# Patient Record
Sex: FEMALE
[De-identification: names, ages, dates, MRNs, and addresses within clinical notes are randomized per-mention and may not be internally consistent; named-entity substitution may affect disease eponyms.]

---

## 2018-03-10 ENCOUNTER — HOSPITAL ENCOUNTER (EMERGENCY)
Dept: HOSPITAL 14 - H.ER | Age: 68
Discharge: HOME | End: 2018-03-10
Payer: MEDICARE

## 2018-03-10 VITALS — SYSTOLIC BLOOD PRESSURE: 118 MMHG | DIASTOLIC BLOOD PRESSURE: 80 MMHG | RESPIRATION RATE: 20 BRPM | HEART RATE: 79 BPM

## 2018-03-10 VITALS — BODY MASS INDEX: 18.8 KG/M2

## 2018-03-10 VITALS — TEMPERATURE: 97.8 F

## 2018-03-10 DIAGNOSIS — S20.219A: Primary | ICD-10-CM
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
## 2018-03-10 DIAGNOSIS — Z85.850: ICD-10-CM

## 2018-03-10 DIAGNOSIS — Z88.0: ICD-10-CM

## 2018-03-10 DIAGNOSIS — F17.200: ICD-10-CM

## 2018-03-10 DIAGNOSIS — J44.9: ICD-10-CM
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
## 2018-03-10 DIAGNOSIS — Z88.5: ICD-10-CM

## 2018-03-10 LAB
APTT BLD: 31.7 SECONDS (ref 25.6–37.1)
BASOPHILS # BLD AUTO: 0 K/UL (ref 0–0.2)
BASOPHILS NFR BLD: 0.8 % (ref 0–2)
BUN SERPL-MCNC: 6 MG/DL (ref 7–17)
CALCIUM SERPL-MCNC: 8.9 MG/DL (ref 8.4–10.2)
EOSINOPHIL # BLD AUTO: 0.1 K/UL (ref 0–0.7)
EOSINOPHIL NFR BLD: 1.9 % (ref 0–4)
ERYTHROCYTE [DISTWIDTH] IN BLOOD BY AUTOMATED COUNT: 13.5 % (ref 11.5–14.5)
GFR NON-AFRICAN AMERICAN: > 60
HGB BLD-MCNC: 13.4 G/DL (ref 12–16)
INR PPP: 0.9 (ref 0.9–1.2)
LYMPHOCYTES # BLD AUTO: 1.4 K/UL (ref 1–4.3)
LYMPHOCYTES NFR BLD AUTO: 28.3 % (ref 20–40)
MCH RBC QN AUTO: 33.6 PG (ref 27–31)
MCHC RBC AUTO-ENTMCNC: 33.9 G/DL (ref 33–37)
MCV RBC AUTO: 98.8 FL (ref 81–99)
MONOCYTES # BLD: 0.6 K/UL (ref 0–0.8)
MONOCYTES NFR BLD: 12.4 % (ref 0–10)
NEUTROPHILS # BLD: 2.7 K/UL (ref 1.8–7)
NEUTROPHILS NFR BLD AUTO: 56.6 % (ref 50–75)
NRBC BLD AUTO-RTO: 0.1 % (ref 0–0)
PLATELET # BLD: 219 K/UL (ref 130–400)
PMV BLD AUTO: 7.7 FL (ref 7.2–11.7)
PROTHROMBIN TIME: 9.8 SECONDS (ref 9.8–13.1)
RBC # BLD AUTO: 3.98 MIL/UL (ref 3.8–5.2)
WBC # BLD AUTO: 4.8 K/UL (ref 4.8–10.8)

## 2018-03-10 PROCEDURE — 71120 X-RAY EXAM BREASTBONE 2/>VWS: CPT

## 2018-03-10 PROCEDURE — 99283 EMERGENCY DEPT VISIT LOW MDM: CPT

## 2018-03-10 PROCEDURE — 85610 PROTHROMBIN TIME: CPT

## 2018-03-10 PROCEDURE — 85730 THROMBOPLASTIN TIME PARTIAL: CPT

## 2018-03-10 PROCEDURE — 93005 ELECTROCARDIOGRAM TRACING: CPT

## 2018-03-10 PROCEDURE — 96374 THER/PROPH/DIAG INJ IV PUSH: CPT

## 2018-03-10 PROCEDURE — 80048 BASIC METABOLIC PNL TOTAL CA: CPT

## 2018-03-10 PROCEDURE — 71046 X-RAY EXAM CHEST 2 VIEWS: CPT

## 2018-03-10 PROCEDURE — 85025 COMPLETE CBC W/AUTO DIFF WBC: CPT

## 2018-03-10 PROCEDURE — 84484 ASSAY OF TROPONIN QUANT: CPT

## 2018-03-10 NOTE — RAD
HISTORY:

chest pain  



COMPARISON:

No prior.



TECHNIQUE:

Chest PA and lateral



FINDINGS:



LUNGS:

No active pulmonary disease. 3.5 centimeter possible mass along the 

posterior inferior lung field (question right versus left) noted on 

lateral view only. 



PLEURA:

No significant pleural effusion identified. No pneumothorax apparent.



CARDIOVASCULAR:

Normal.



OSSEOUS STRUCTURES:

No significant abnormalities.



VISUALIZED UPPER ABDOMEN:

Normal.



OTHER FINDINGS:

None.



IMPRESSION:

3.5 centimeter possible mass along the posterior inferior lung field 

(question right versus left) noted on lateral view only.

## 2018-03-10 NOTE — ED PDOC
HPI: Chest Pain


Time Seen by Provider: 03/10/18 05:49


Chief Complaint (Nursing): Chest Pain


Chief Complaint (Provider): Chest Pain


History Per: Patient


History/Exam Limitations: no limitations


Additional Complaint(s): 


 68 y/o female with past medical history of COPD and thyroid cancer presents to 

the ED for evaluation of chest pain. About a week ago she fell and hit her 

sternum against dresser and ever since pain has been worsening. Patient has 

difficulty breathing and cough. Denies any further medical complaints.








Past Medical History


Reviewed: Historical Data, Nursing Documentation, Vital Signs


Vital Signs: 


 Last Vital Signs











Temp  97.8 F   03/10/18 05:50


 


Pulse  79   03/10/18 09:42


 


Resp  20   03/10/18 09:42


 


BP  118/80   03/10/18 09:42


 


Pulse Ox  96   03/10/18 09:42














- Medical History


PMH: Arthritis (TENDERNITIS), COPD, Gastrointestinal Ulcer


   Denies: Chronic Kidney Disease





- Surgical History


Surgical History: Tonsillectomy





- Family History


Family History: States: Unknown Family Hx





- Social History


Current smoker - smoking cessation education provided: Yes (Heavy smoker >10 

ciarettes daily)


Alcohol: None


Drugs: Denies





- Home Medications


Home Medications: 


 Ambulatory Orders











 Medication  Instructions  Recorded


 


traMADol [Ultram] 50 mg PO Q8 #10 tab 03/10/18














- Allergies


Allergies/Adverse Reactions: 


 Allergies











Allergy/AdvReac Type Severity Reaction Status Date / Time


 


acetaminophen Allergy  RASH Verified 01/19/16 07:30


 


oxycodone Allergy  RASH Verified 03/10/18 05:49


 


Penicillins Allergy  ANAPHYLAXIS Verified 03/10/18 05:49


 


propoxyphene napsylate Allergy  RASH Verified 03/10/18 05:49





[From Darvocet-N]     


 


ciprofloxacin AdvReac  RASH Verified 03/10/18 05:49


 


ketorolac tromethamine AdvReac  RASH Verified 03/10/18 05:49





[From Toradol]     


 


NSAIDS (Non-Steroidal AdvReac  PAIN Verified 03/10/18 05:49





Anti-Inflamma     














Review of Systems


ROS Statement: Except As Marked, All Systems Reviewed And Found Negative (As 

per HPI, otherwise negative)


Cardiovascular: Positive for: Chest Pain





Physical Exam





- Reviewed


Nursing Documentation Reviewed: Yes


Vital Signs Reviewed: Yes





- Physical Exam


Appears: Positive for: Well, Non-toxic, No Acute Distress


Head Exam: Positive for: ATRAUMATIC, NORMAL INSPECTION, NORMOCEPHALIC


Skin: Positive for: Normal Color, Warm, Dry


Eye Exam: Positive for: EOMI, Normal appearance, PERRL


ENT: Positive for: Normal ENT Inspection


Neck: Positive for: Normal, Painless ROM, Supple


Cardiovascular/Chest: Positive for: Regular Rate, Rhythm, Other (Palpation to 

mid sternum, no crepatous or step off noted).  Negative for: Murmur


Respiratory: Positive for: Normal Breath Sounds.  Negative for: Accessory 

Muscle Use, Respiratory Distress


Gastrointestinal/Abdominal: Positive for: Normal Exam, Bowel Sounds, Soft.  

Negative for: Tenderness


Back: Positive for: Normal Inspection


Extremity: Positive for: Normal ROM.  Negative for: Deformity


Neurologic/Psych: Positive for: Alert, Oriented (x3)





- Laboratory Results


Result Diagrams: 


 03/10/18 06:18





 03/10/18 06:18





- ECG


O2 Sat by Pulse Oximetry: 94 (RA)


Pulse Ox Interpretation: Normal





Medical Decision Making


Medical Decision Making: 


 Time: 06:16





Initial Impression: Sternum contusion





Plan:


EKG


BMP


Troponin I


Partial thromboplastin   


Prothrombin time


Chest x-ray


Flexeril 10mg PO


Morphine 4mg IVP


Cardiac monitor


Sternum x-ray


Reevaluation 





0700


Will endorse to Dr. Borges pending xray and re-eval.


--------------------------------------------------------------------------------

-----------------


Scribe Attestation:


Documented by Heidy Amin acting as a scribe for Desmond Melchor MD.


      


MD Scribe Attestation:


All medical record entries made by the Scribe were at my direction and 

personally dictated by me. I have reviewed the chart and agree that the record 

accurately reflects my personal performance of the history, physical exam, 

medical decision making, and the department course for this patient. I have 

also personally directed, reviewed, and agree with the discharge instructions 

and disposition.








Disposition





- Clinical Impression


Clinical Impression: 


 Contusion of chest wall








- Patient ED Disposition


Is Patient to be Admitted: Transfer of Care





- Disposition


Referrals: 


[facility] [Outside]


Disposition: Transfer of Care


Disposition Time: 07:00


Condition: FAIR


Prescriptions: 


traMADol [Ultram] 50 mg PO Q8 #10 tab


Instructions:  Contusion (DC)


Forms:  [facility] (English)


Patient Signed Over To: Osmany Borges


Handoff Comments: pending x-ray and re-eval

## 2018-03-10 NOTE — RAD
HISTORY:

chest pain, sternal pain after injury  



COMPARISON:

No prior



FINDINGS:



BONES:

Normal. No fracture.



JOINTS:

Normal. No osteoarthritis.



SOFT TISSUE:

Normal.



OTHER FINDINGS:

None .



IMPRESSION:

Normal Bone Xray.

## 2018-03-10 NOTE — ED PDOC
- Laboratory Results


Result Diagrams: 


 03/10/18 06:18





 03/10/18 06:18





- ECG


O2 Sat by Pulse Oximetry: 97





Medical Decision Making


Medical Decision Making: 





No fx seen on xray sternum or CXR. ? mass lower post lung feild on lateral 

view. Appears to have similar appearance 1/2017 CXR lat view. Will have pt 

follow up with PMD for eval of possible mass.





Disposition





- Clinical Impression


Clinical Impression: 


 Contusion of chest wall








- POA


Present On Arrival: None





- Disposition


Referrals: 


[facility] [Outside]


Disposition: Routine/Home


Disposition Time: 09:16


Condition: FAIR


Prescriptions: 


traMADol [Ultram] 50 mg PO Q8 #10 tab


Instructions:  Contusion (DC)


Forms:  [facility] (English)

## 2018-03-11 VITALS — OXYGEN SATURATION: 94 %

## 2018-03-11 NOTE — CARD
--------------- APPROVED REPORT --------------





EKG Measurement

Heart [id number]

OR [id number]

[id number]

[id number]

[id number]



<Conclusion>

Normal sinus rhythm

Normal ECG

## 2018-10-05 ENCOUNTER — HOSPITAL ENCOUNTER (EMERGENCY)
Dept: HOSPITAL 14 - H.ER | Age: 68
Discharge: LEFT BEFORE DISCHARGE | End: 2018-10-05
Payer: MEDICARE

## 2018-10-05 VITALS — BODY MASS INDEX: 18.8 KG/M2

## 2018-10-05 VITALS — SYSTOLIC BLOOD PRESSURE: 108 MMHG | DIASTOLIC BLOOD PRESSURE: 60 MMHG

## 2018-10-05 VITALS — TEMPERATURE: 98 F | RESPIRATION RATE: 18 BRPM

## 2018-10-05 DIAGNOSIS — R07.89: ICD-10-CM

## 2018-10-05 DIAGNOSIS — Z88.1: ICD-10-CM

## 2018-10-05 DIAGNOSIS — R00.0: Primary | ICD-10-CM

## 2018-10-05 DIAGNOSIS — R00.2: ICD-10-CM

## 2018-10-05 DIAGNOSIS — M19.90: ICD-10-CM

## 2018-10-05 DIAGNOSIS — J44.9: ICD-10-CM

## 2018-10-05 DIAGNOSIS — Z88.0: ICD-10-CM

## 2018-10-05 LAB
ALBUMIN SERPL-MCNC: 3.9 G/DL (ref 3.5–5)
ALBUMIN/GLOB SERPL: 1.4 {RATIO} (ref 1–2.1)
ALT SERPL-CCNC: 19 U/L (ref 9–52)
APTT BLD: 32.1 SECONDS (ref 25.6–37.1)
ARTERIAL BLOOD GAS O2 SAT: 98.7 % (ref 95–98)
ARTERIAL BLOOD GAS PCO2: 42 MM/HG (ref 35–45)
ARTERIAL BLOOD GAS TCO2: 28.5 MMOL/L (ref 22–28)
ARTERIAL PATENCY WRIST A: YES
AST SERPL-CCNC: 38 U/L (ref 14–36)
BASOPHILS # BLD AUTO: 0 K/UL (ref 0–0.2)
BASOPHILS NFR BLD: 0.7 % (ref 0–2)
BNP SERPL-MCNC: 318 PG/ML (ref 0–900)
BUN SERPL-MCNC: 11 MG/DL (ref 7–17)
CALCIUM SERPL-MCNC: 9 MG/DL (ref 8.4–10.2)
D DIMER: < 200 NG/MLDDU (ref 0–230)
EOSINOPHIL # BLD AUTO: 0.1 K/UL (ref 0–0.7)
EOSINOPHIL NFR BLD: 1.9 % (ref 0–4)
ERYTHROCYTE [DISTWIDTH] IN BLOOD BY AUTOMATED COUNT: 13.5 % (ref 11.5–14.5)
GFR NON-AFRICAN AMERICAN: > 60
HCO3 BLDA-SCNC: 26.7 MMOL/L (ref 21–28)
HGB BLD-MCNC: 13.8 G/DL (ref 12–16)
INHALED O2 CONCENTRATION: 21 %
INR PPP: 0.9
LYMPHOCYTES # BLD AUTO: 1.2 K/UL (ref 1–4.3)
LYMPHOCYTES NFR BLD AUTO: 27 % (ref 20–40)
MCH RBC QN AUTO: 33.8 PG (ref 27–31)
MCHC RBC AUTO-ENTMCNC: 34.5 G/DL (ref 33–37)
MCV RBC AUTO: 97.8 FL (ref 81–99)
MONOCYTES # BLD: 0.4 K/UL (ref 0–0.8)
MONOCYTES NFR BLD: 9.2 % (ref 0–10)
NEUTROPHILS # BLD: 2.8 K/UL (ref 1.8–7)
NEUTROPHILS NFR BLD AUTO: 61.2 % (ref 50–75)
NRBC BLD AUTO-RTO: 0 % (ref 0–0)
PH BLDA: 7.42 [PH] (ref 7.35–7.45)
PLATELET # BLD: 245 K/UL (ref 130–400)
PMV BLD AUTO: 8.1 FL (ref 7.2–11.7)
PO2 BLDA: 75 MM/HG (ref 80–100)
PROTHROMBIN TIME: 9.9 SECONDS (ref 9.8–13.1)
RBC # BLD AUTO: 4.07 MIL/UL (ref 3.8–5.2)
T3: 0.88 NMOL/L (ref 1.49–2.6)
WBC # BLD AUTO: 4.6 K/UL (ref 4.8–10.8)

## 2018-10-05 PROCEDURE — 85025 COMPLETE CBC W/AUTO DIFF WBC: CPT

## 2018-10-05 PROCEDURE — 82803 BLOOD GASES ANY COMBINATION: CPT

## 2018-10-05 PROCEDURE — 93005 ELECTROCARDIOGRAM TRACING: CPT

## 2018-10-05 PROCEDURE — 84439 ASSAY OF FREE THYROXINE: CPT

## 2018-10-05 PROCEDURE — 84100 ASSAY OF PHOSPHORUS: CPT

## 2018-10-05 PROCEDURE — 71045 X-RAY EXAM CHEST 1 VIEW: CPT

## 2018-10-05 PROCEDURE — 83880 ASSAY OF NATRIURETIC PEPTIDE: CPT

## 2018-10-05 PROCEDURE — 80053 COMPREHEN METABOLIC PANEL: CPT

## 2018-10-05 PROCEDURE — 99285 EMERGENCY DEPT VISIT HI MDM: CPT

## 2018-10-05 PROCEDURE — 85610 PROTHROMBIN TIME: CPT

## 2018-10-05 PROCEDURE — 84443 ASSAY THYROID STIM HORMONE: CPT

## 2018-10-05 PROCEDURE — 36600 WITHDRAWAL OF ARTERIAL BLOOD: CPT

## 2018-10-05 PROCEDURE — 84484 ASSAY OF TROPONIN QUANT: CPT

## 2018-10-05 PROCEDURE — 85378 FIBRIN DEGRADE SEMIQUANT: CPT

## 2018-10-05 PROCEDURE — 94640 AIRWAY INHALATION TREATMENT: CPT

## 2018-10-05 PROCEDURE — 87040 BLOOD CULTURE FOR BACTERIA: CPT

## 2018-10-05 PROCEDURE — 96374 THER/PROPH/DIAG INJ IV PUSH: CPT

## 2018-10-05 PROCEDURE — 84480 ASSAY TRIIODOTHYRONINE (T3): CPT

## 2018-10-05 PROCEDURE — 83735 ASSAY OF MAGNESIUM: CPT

## 2018-10-05 PROCEDURE — 85730 THROMBOPLASTIN TIME PARTIAL: CPT

## 2018-10-05 NOTE — ED PDOC
HPI: SOB/CHF/COPD


Time Seen by Provider: 10/05/18 18:08


Chief Complaint (Nursing): Shortness Of Breath


Chief Complaint (Provider): Shortness Of Breath


History Per: Patient


History/Exam Limitations: no limitations


Onset/Duration Of Symptoms: Hrs, Sudden Onset


Current Symptoms Are (Timing): Still Present


Quality: Tightness


Additional Complaint(s): 


Yodit Griffiths is a 67 year old female with a past medical history of 

arthritis and COPD who is presenting to the ED for evaluation of sudden onset 

shortness of breath associated with chest tightness, onset just prior to 

arrival. Patient states that she initially treated herself with her inhaler but 

she didnt not have any relief in symptoms so she came to the ED for further 

evaluation. She admits to recently having some chills, a subjective fever, and 

increased cough with sputum production. Patient reports that the last two weeks,

she has decreased her levothyroxine because her endocrinologist saw that her 

thyroid function seemed to be becoming elevated. She also reports palpitations, 

lightheadedness, and mild anxiety. 


PMD: Dmitry Moore





Endocrinologist: Dr. Mccall











Against Medical Advice





- AMA


Patient Left Against Medical Advice: 


The patient declines admission to the hospital and wishes to leave the Emergency

Department.  This action is against my medical advice. This decision was made 

with informed refusal. The patient was told that admission to the hospital is 

necessary.  Explanation of the reasons why were discussed.  


The risks of leaving were explained to the patient and include, but are not 

limited to, worsening of known or currently unknown conditions, permanent 

disability and death from undiagnosed or untreated conditions. 


The patient has the capacity to make this informed decision and understands my 

explanation of the current medical problem and risks of leaving. 


The patient voluntarily accepts these risks and signed an AMA form documenting 

our conversation.


The patient was given the opportunity to ask questions and reconsider.  The 

patient was encouraged to return to the Emergency Department at any time for 

further care.








Past Medical History


Reviewed: Historical Data, Nursing Documentation, Vital Signs


Vital Signs: 





                                Last Vital Signs











Temp  98.4 F   10/05/18 18:01


 


Pulse  110 H  10/05/18 18:01


 


Resp  18   10/05/18 18:01


 


BP  153/97 H  10/05/18 18:01


 


Pulse Ox  98   10/05/18 18:01














- Medical History


PMH: Arthritis (TENDERNITIS), COPD, Gastrointestinal Ulcer


   Denies: Chronic Kidney Disease





- Surgical History


Surgical History: Tonsillectomy





- Family History


Family History: States: Unknown Family Hx





- Social History


Current smoker - smoking cessation education provided: No


Alcohol: None


Drugs: Denies





- Home Medications


Home Medications: 


                                Ambulatory Orders











 Medication  Instructions  Recorded


 


RX: traMADol [Ultram] 50 mg PO Q8 #10 tab 03/10/18














- Allergies


Allergies/Adverse Reactions: 


                                    Allergies











Allergy/AdvReac Type Severity Reaction Status Date / Time


 


acetaminophen Allergy  RASH Verified 10/05/18 18:01


 


oxycodone Allergy  RASH Verified 10/05/18 18:01


 


Penicillins Allergy  ANAPHYLAXIS Verified 10/05/18 18:01


 


propoxyphene napsylate Allergy  RASH Verified 10/05/18 18:01





[From Darvocet-N]     


 


ciprofloxacin AdvReac  RASH Verified 10/05/18 18:01


 


ketorolac tromethamine AdvReac  RASH Verified 10/05/18 18:01





[From Toradol]     


 


NSAIDS (Non-Steroidal AdvReac  PAIN Verified 10/05/18 18:01





Anti-Inflamma     














Review of Systems


ROS Statement: Except As Marked, All Systems Reviewed And Found Negative


Constitutional: Positive for: Fever


Cardiovascular: Positive for: Chest Pain (tightness )


Respiratory: Positive for: Cough, Shortness of Breath, Sputum


Neurological: Positive for: Other (lightheadedness)





Physical Exam





- Reviewed


Nursing Documentation Reviewed: Yes


Vital Signs Reviewed: Yes





- Physical Exam


Appears: Positive for: No Acute Distress (mildly anxious)


Head Exam: Positive for: ATRAUMATIC, NORMOCEPHALIC


Skin: Positive for: Warm, Dry


Eye Exam: Positive for: EOMI, PERRL


ENT: Positive for: Pharynx Is (clear), Other (dry mucous membranes)


Neck: Positive for: Painless ROM, Supple


Cardiovascular/Chest: Positive for: Tachycardia (with regular rhythm)


Respiratory: Positive for: Other (poor air movement).  Negative for: Rales, 

Rhonchi, Wheezing


Gastrointestinal/Abdominal: Positive for: Soft.  Negative for: Tenderness


Back: Positive for: Normal Inspection.  Negative for: Decreased ROM


Extremity: Positive for: Normal ROM, Other (RIGHT hand splint).  Negative for: 

Deformity


Lymphatic: Negative for: Adenopathy


Neurologic/Psych: Positive for: Alert.  Negative for: Motor/Sensory Deficits





- Laboratory Results


Result Diagrams: 


                                 10/05/18 18:56





                                 10/05/18 18:56





- ECG


ECG Rhythm: Positive for: Normal QRS, Normal ST Segment, Sinus Tachycardia


Rate: 104


O2 Sat by Pulse Oximetry: 98 (RA)


Pulse Ox Interpretation: Normal





Medical Decision Making


Medical Decision Making: 


Time: 18:19


Impression: Tachycardia with shortness of breath


Differentials (including but not limited to): hyperthyroidism, thyroid storm, 

ACS, PE, pneumonia, CHF


Plan:


--Blood Type and Screen


--ABG Shock Panel


--EKG


--BNP


--CMP


--Free T4


--Magnesium


--Phosphorous


--T3


--TSH


--Troponin


--ED Urine Dipsitck


--CBC


--D Dimer


--Coags


--Chest X-Ray


--Blood Culture





Initial labs with no emergently significant abnormalities. Pt's HR improved 

without intervention in ER. However advised that due to cardiac and pulmonary 

risk factors, pt should stay for observation. However pt declines this. 

Understands risk of death or severe disability and wished to go home anyway. AMA

signed.





--------------------------------

-----------------------------------------------------------------


Scribe Attestation:   


Documented by Qi Wong, acting as a scribe for Ruma Shi MD.





Provider Scribe Attestation:


All medical record entries made by the Scribe were at my direction and 

personally dictated by me. I have reviewed the chart and agree that the record 

accurately reflects my personal performance of the history, physical exam, 

medical decision making, and the department course for this patient. I have also

personally directed, reviewed, and agree with the discharge instructions and 

disposition.











Disposition





- Clinical Impression


Clinical Impression: 


 Palpitations, Tachycardia








- Disposition


Disposition: Against Medical Advice


Disposition Time: 21:30


Condition: UNKNOWN


Additional Instructions: 


RETURN TO ER IMMEDIATELY FOR ADMISSION AND FURTHER EVALUATION OF SHORTNESS OF 

BREATH.


Instructions:  Leaving Against Medical Advice

## 2018-10-06 VITALS — HEART RATE: 104 BPM | OXYGEN SATURATION: 98 %

## 2018-10-06 NOTE — RAD
Date of service: 



10/05/2018



HISTORY:

 sob tachycardia 



COMPARISON:

03/10/2018 



FINDINGS:



LUNGS:

Lungs are mildly hyperinflated.  Mild interstitial changes are noted. 

 There family a shin of the left hemidiaphragm, unchanged from prior 

study.  No hilar enlargement is noted.



PLEURA:

No significant pleural effusion identified, no pneumothorax apparent.



CARDIOVASCULAR:

Normal.



OSSEOUS STRUCTURES:

No significant abnormalities.



VISUALIZED UPPER ABDOMEN:

Normal.



OTHER FINDINGS:

None.



IMPRESSION:

No active disease.

## 2018-10-06 NOTE — CARD
--------------- APPROVED REPORT --------------





Date of service: 10/05/2018



EKG Measurement

Heart [id number]

HI [id number]

[id number]

[id number]

[id number]



<Conclusion>

Sinus tachycardia

Cannot rule out Anterior infarct, age undetermined

Abnormal ECG

## 2018-10-25 ENCOUNTER — HOSPITAL ENCOUNTER (OUTPATIENT)
Dept: HOSPITAL 42 - CATH | Age: 68
Discharge: HOME | End: 2018-10-25
Attending: INTERNAL MEDICINE
Payer: MEDICARE

## 2018-10-25 VITALS — HEART RATE: 84 BPM | OXYGEN SATURATION: 92 %

## 2018-10-25 VITALS — BODY MASS INDEX: 18.8 KG/M2

## 2018-10-25 VITALS — TEMPERATURE: 98.5 F

## 2018-10-25 VITALS — DIASTOLIC BLOOD PRESSURE: 72 MMHG | SYSTOLIC BLOOD PRESSURE: 132 MMHG

## 2018-10-25 VITALS — RESPIRATION RATE: 18 BRPM

## 2018-10-25 DIAGNOSIS — Z88.6: ICD-10-CM

## 2018-10-25 DIAGNOSIS — M19.90: ICD-10-CM

## 2018-10-25 DIAGNOSIS — Z85.850: ICD-10-CM

## 2018-10-25 DIAGNOSIS — R07.9: Primary | ICD-10-CM

## 2018-10-25 DIAGNOSIS — Z88.5: ICD-10-CM

## 2018-10-25 DIAGNOSIS — J44.9: ICD-10-CM

## 2018-10-25 DIAGNOSIS — J45.909: ICD-10-CM

## 2018-10-25 DIAGNOSIS — R94.39: ICD-10-CM

## 2018-10-25 DIAGNOSIS — Z88.0: ICD-10-CM

## 2018-10-25 DIAGNOSIS — F17.200: ICD-10-CM

## 2018-10-25 DIAGNOSIS — Z88.1: ICD-10-CM

## 2018-10-25 LAB
APTT BLD: 30.9 SECONDS (ref 25.1–36.5)
BASOPHILS # BLD AUTO: 0.04 K/MM3 (ref 0–2)
BASOPHILS NFR BLD: 1.1 % (ref 0–3)
BUN SERPL-MCNC: 5 MG/DL (ref 7–21)
CALCIUM SERPL-MCNC: 9 MG/DL (ref 8.4–10.5)
EOSINOPHIL # BLD: 0.2 10*3/UL (ref 0–0.7)
EOSINOPHIL NFR BLD: 4.2 % (ref 1.5–5)
ERYTHROCYTE [DISTWIDTH] IN BLOOD BY AUTOMATED COUNT: 13.2 % (ref 11.5–14.5)
GFR NON-AFRICAN AMERICAN: > 60
GRANULOCYTES # BLD: 1.3 10*3/UL (ref 1.4–6.5)
GRANULOCYTES NFR BLD: 34.4 % (ref 50–68)
HDLC SERPL-MCNC: 161 MG/DL (ref 29–60)
HGB BLD-MCNC: 13.8 G/DL (ref 12–16)
INR PPP: 0.91
LDLC SERPL-MCNC: 59 MG/DL (ref 0–129)
LYMPHOCYTES # BLD: 1.7 10*3/UL (ref 1.2–3.4)
LYMPHOCYTES NFR BLD AUTO: 45.5 % (ref 22–35)
MCH RBC QN AUTO: 32.6 PG (ref 25–35)
MCHC RBC AUTO-ENTMCNC: 34.8 G/DL (ref 31–37)
MCV RBC AUTO: 93.9 FL (ref 80–105)
MONOCYTES # BLD AUTO: 0.6 10*3/UL (ref 0.1–0.6)
MONOCYTES NFR BLD: 14.8 % (ref 1–6)
PLATELET # BLD: 229 10^3/UL (ref 120–450)
PMV BLD AUTO: 9.5 FL (ref 7–11)
PROTHROMBIN TIME: 10.5 SECONDS (ref 9.4–12.5)
RBC # BLD AUTO: 4.23 10^6/UL (ref 3.5–6.1)
WBC # BLD AUTO: 3.8 10^3/UL (ref 4.5–11)

## 2018-10-25 NOTE — CARDCATH
PROCEDURE DATE:  10/25/2018



HISTORY:  The patient is a 67-year-old woman with a long history of smoking

and COPD, who presents with a markedly abnormal stress test.  Although her

nuclear studies revealed no ischemia, her EKG changes during her stress

were markedly abnormal.



Because of this, cardiac catheterization was recommended.



PROCEDURES:  Left heart catheterization with coronary arteriography and

left ventriculogram.



COMPLICATIONS:  There were no complications.



I performed moderate sedation, which included the presence of an

independent trained observer that observed the patient's consciousness and

physiologic status.  After administration of fentanyl and Versed, my intra

service time was 15 minutes.



Findings on catheterization revealed a left ventricle that contracted

normally.  Estimated ejection fraction of 55-60%.



Her coronary anatomy revealed a right dominant circulation.  The RCA

revealed mild intimal irregularities without significant stenoses.



The left main artery was unremarkable.



The LAD and diagonal vessels were free of significant disease.



The circumflex artery and obtuse marginal branches revealed intimal

irregularities without critical lesions.



The manual compression was used to close the femoral artery site.  The

patient tolerated the procedure well.



In summary, the procedure revealed mild intimal irregularity in the

coronary tree with no critical lesions.



LV function is normal.



Given these findings, the patient's marked ST-T changes does not represent

active ischemia.  LV function is normal.



Given these findings, I have discussed the need to stop smoking with the

patient in detail.







__________________________________________

Iain Del Rio MD



DD:  10/25/2018 10:31:37

DT:  10/25/2018 10:36:57

Job # [id number]

## 2018-10-26 ENCOUNTER — HOSPITAL ENCOUNTER (EMERGENCY)
Dept: HOSPITAL 14 - H.ER | Age: 68
LOS: 1 days | Discharge: ANOTHER HOSPITAL | End: 2018-10-27
Payer: MEDICARE

## 2018-10-26 VITALS — BODY MASS INDEX: 18.8 KG/M2

## 2018-10-26 DIAGNOSIS — J44.9: ICD-10-CM

## 2018-10-26 DIAGNOSIS — Z88.0: ICD-10-CM

## 2018-10-26 DIAGNOSIS — Z79.01: ICD-10-CM

## 2018-10-26 DIAGNOSIS — Z88.1: ICD-10-CM

## 2018-10-26 DIAGNOSIS — L76.22: Primary | ICD-10-CM

## 2018-10-26 DIAGNOSIS — Z79.82: ICD-10-CM

## 2018-10-26 LAB
ALBUMIN SERPL-MCNC: 4 G/DL (ref 3.5–5)
ALBUMIN/GLOB SERPL: 1.5 {RATIO} (ref 1–2.1)
ALT SERPL-CCNC: 17 U/L (ref 9–52)
APTT BLD: 29.7 SECONDS (ref 25.6–37.1)
AST SERPL-CCNC: 27 U/L (ref 14–36)
BASOPHILS # BLD AUTO: 0 K/UL (ref 0–0.2)
BASOPHILS NFR BLD: 0.9 % (ref 0–2)
BUN SERPL-MCNC: 8 MG/DL (ref 7–17)
CALCIUM SERPL-MCNC: 9.2 MG/DL (ref 8.4–10.2)
EOSINOPHIL # BLD AUTO: 0.1 K/UL (ref 0–0.7)
EOSINOPHIL NFR BLD: 1.7 % (ref 0–4)
ERYTHROCYTE [DISTWIDTH] IN BLOOD BY AUTOMATED COUNT: 13.5 % (ref 11.5–14.5)
GFR NON-AFRICAN AMERICAN: > 60
HGB BLD-MCNC: 12.7 G/DL (ref 12–16)
INR PPP: 1
LYMPHOCYTES # BLD AUTO: 1 K/UL (ref 1–4.3)
LYMPHOCYTES NFR BLD AUTO: 18.1 % (ref 20–40)
MCH RBC QN AUTO: 33.6 PG (ref 27–31)
MCHC RBC AUTO-ENTMCNC: 34.3 G/DL (ref 33–37)
MCV RBC AUTO: 98.1 FL (ref 81–99)
MONOCYTES # BLD: 0.5 K/UL (ref 0–0.8)
MONOCYTES NFR BLD: 9 % (ref 0–10)
NEUTROPHILS # BLD: 3.7 K/UL (ref 1.8–7)
NEUTROPHILS NFR BLD AUTO: 70.3 % (ref 50–75)
NRBC BLD AUTO-RTO: 0 % (ref 0–0)
PLATELET # BLD: 175 K/UL (ref 130–400)
PMV BLD AUTO: 8.4 FL (ref 7.2–11.7)
PROTHROMBIN TIME: 10.9 SECONDS (ref 9.8–13.1)
RBC # BLD AUTO: 3.77 MIL/UL (ref 3.8–5.2)
WBC # BLD AUTO: 5.2 K/UL (ref 4.8–10.8)

## 2018-10-26 PROCEDURE — 85610 PROTHROMBIN TIME: CPT

## 2018-10-26 PROCEDURE — 96374 THER/PROPH/DIAG INJ IV PUSH: CPT

## 2018-10-26 PROCEDURE — 86850 RBC ANTIBODY SCREEN: CPT

## 2018-10-26 PROCEDURE — 80053 COMPREHEN METABOLIC PANEL: CPT

## 2018-10-26 PROCEDURE — 85025 COMPLETE CBC W/AUTO DIFF WBC: CPT

## 2018-10-26 PROCEDURE — 96361 HYDRATE IV INFUSION ADD-ON: CPT

## 2018-10-26 PROCEDURE — 86900 BLOOD TYPING SEROLOGIC ABO: CPT

## 2018-10-26 PROCEDURE — 85730 THROMBOPLASTIN TIME PARTIAL: CPT

## 2018-10-26 PROCEDURE — 74177 CT ABD & PELVIS W/CONTRAST: CPT

## 2018-10-26 PROCEDURE — 84484 ASSAY OF TROPONIN QUANT: CPT

## 2018-10-26 PROCEDURE — 99283 EMERGENCY DEPT VISIT LOW MDM: CPT

## 2018-10-26 NOTE — ED PDOC
Lower Extremity Pain/Injury


Time Seen by Provider: 10/26/18 18:32


Chief Complaint (Nursing): Lower Extremity Problem/Injury


Chief Complaint (Provider): Grain hematoma


History Per: Patient


History/Exam Limitations: no limitations


Onset/Duration Of Symptoms: Days (yesterday)


Current Symptoms Are (Timing): Still Present


Additional Complaint(s): 





Pt. with right groin pain worsening since yesterday with increased swelling and 

black and blue in right groin.  Denies any numbness, tingles, weakness.  Able to

walk and move leg around.  Had a cath in her right groin yesterday by Dr. Meneses. 

Took 2 plavix prior to that.  Since then on aspirin.  No other blood thinners.  

No chest pain, dyspnea.   Had cath for evaluation of her heart.  States it was 

normal. 








PCP:  Dr. Johnson. 





Past Medical History


Reviewed: Historical Data, Nursing Documentation, Vital Signs


Vital Signs: 





                                Last Vital Signs











Temp  97.7 F   10/26/18 18:21


 


Pulse  103 H  10/26/18 18:21


 


Resp  18   10/26/18 18:21


 


BP  131/72   10/26/18 18:21


 


Pulse Ox  100   10/26/18 18:21














- Medical History


PMH: Arthritis (TENDERNITIS), Asthma, COPD, Gastrointestinal Ulcer


   Denies: Chronic Kidney Disease





- Surgical History


Surgical History: Tonsillectomy


   Denies: Pacemaker





- Family History


Family History: States: Unknown Family Hx





- Home Medications


Home Medications: 


                                Ambulatory Orders











 Medication  Instructions  Recorded


 


Albuterol HFA [Ventolin HFA 90 0.09 mg IH PRN PRN 10/24/18





mcg/actuation (8 g)]  


 


Aspirin [Ecotrin] 81 mg PO DAILY 10/24/18


 


Levothyroxine [Synthroid] 75 mcg PO DAILY 10/24/18














- Allergies


Allergies/Adverse Reactions: 


                                    Allergies











Allergy/AdvReac Type Severity Reaction Status Date / Time


 


acetaminophen Allergy  RASH Verified 10/05/18 18:01


 


Penicillins Allergy  ANAPHYLAXIS Verified 10/05/18 18:01


 


propoxyphene napsylate Allergy  RASH Verified 10/05/18 18:01





[From Darvocet-N]     


 


ciprofloxacin AdvReac  RASH Verified 10/05/18 18:01


 


ketorolac tromethamine AdvReac  RASH Verified 10/05/18 18:01





[From Toradol]     


 


NSAIDS (Non-Steroidal AdvReac  PAIN Verified 10/05/18 18:01





Anti-Inflamma     














Review of Systems


ROS Statement: Except As Marked, All Systems Reviewed And Found Negative


Musculoskeletal: Positive for: Other (groin)


Neurological: Negative for: Weakness





Physical Exam





- Reviewed


Nursing Documentation Reviewed: Yes


Vital Signs Reviewed: Yes





- Physical Exam


Appears: Positive for: Uncomfortable


Head Exam: Positive for: ATRAUMATIC, NORMAL INSPECTION, NORMOCEPHALIC


Eye Exam: Positive for: EOMI, Normal appearance, PERRL


ENT: Positive for: Normal ENT Inspection


Neck: Positive for: Normal, Painless ROM


Cardiovascular/Chest: Positive for: Regular Rate, Rhythm


Respiratory: Positive for: CNT, Normal Breath Sounds


Pulses-Dorsalis Pedis (L): 2+


Pulses-Dorsalis Pedis (R): 2+


Pulses-Post. Tibialis (L): 2+


Pulses-Post. Tibialis (R): 2+


Gastrointestinal/Abdominal: Positive for: Tenderness (R groing with large 

hematoma and tender; echymosis in area and upper right leg.)


Back: Positive for: Normal Inspection.  Negative for: L CVA Tenderness, R CVA 

Tenderness


Extremity: Positive for: Normal ROM.  Negative for: Tenderness (of lower 

extremity knee down.)


Neurologic/Psych: Positive for: Alert, Oriented





- Laboratory Results


Result Diagrams: 


                                 10/26/18 19:08





                                 10/26/18 19:08


Interpretation Of Abn Labs: no acute





- ECG


O2 Sat by Pulse Oximetry: 100


Pulse Ox Interpretation: Normal





- Progress


ED Course And Treament: 





2304:  Spoke with surgery resident.  Will see pt. in the ER.  Dr. Meneses paged 

with no response. 





7990:  Pt. will need further observation in hospital.  No vascular on call at 

[facility].  No vascular surgeon available at [facility].  Dr. Lamas out of

town.  Pt. wants to go to [facility].  Spoke with Dr. Amin fellow at [facility].  Will see 

pt. in the ER with Dr. Meneses.  Spoke with Dr. Bishop in the ER.  Aware of case and 

will accept for transfer to ER.  





- Critical Care


Total Time (In Min): 30


Documented Critical Care: Time excludes all time spent performint seperately 

billable procedures





Disposition





- Clinical Impression


Clinical Impression: 


 Hematoma








- Patient ED Disposition


Is Patient to be Admitted: Yes


Counseled Patient/Family Regarding: Studies Performed, Diagnosis





- Disposition


Disposition: Other Institution


Disposition Time: 00:05


Condition: FAIR

## 2018-10-27 VITALS
HEART RATE: 90 BPM | SYSTOLIC BLOOD PRESSURE: 139 MMHG | DIASTOLIC BLOOD PRESSURE: 92 MMHG | RESPIRATION RATE: 20 BRPM | TEMPERATURE: 97.7 F | OXYGEN SATURATION: 95 %

## 2018-10-27 NOTE — CT
Date of service: 



10/26/2018



PROCEDURE:  CT Abdomen and Pelvis with contrast



HISTORY:

hematoma post cath yesterday.



COMPARISON:

CT scan of the abdomen pelvis dated 05/05/2014.



TECHNIQUE:

Contrast dose: 80 mL Omnipaque 300



Radiation dose:



Total exam DLP = 278.15 mGy-cm.



This CT exam was performed using one or more of the following dose 

reduction techniques: Automated exposure control, adjustment of the 

mA and/or kV according to patient size, and/or use of iterative 

reconstruction technique.



FINDINGS:



LOWER THORAX:

Unremarkable. 



LIVER:

Unremarkable. No gross lesion or ductal dilatation. 



GALLBLADDER AND BILE DUCTS:

Unremarkable. 



PANCREAS:

Unremarkable. No gross lesion or ductal dilatation.



SPLEEN:

Unremarkable. 



ADRENALS:

Unremarkable. No mass. 



KIDNEYS AND URETERS:

Unremarkable. No hydronephrosis. No solid mass. 



VASCULATURE:

No aortic aneurysm. Scattered aortic atherosclerotic calcifications.  

Right common femoral artery serpiginous pseudoaneurysm measuring 

approximately 1.9 x 1.0 cm. 



BOWEL:

Colonic diverticulosis.  No obstruction. No gross mural thickening. 



APPENDIX:

No findings to suggest acute appendicitis. 



PERITONEUM:

Unremarkable. No free fluid. No free air. 



LYMPH NODES:

Unremarkable. No enlarged lymph nodes. 



BLADDER:

Unremarkable. 



REPRODUCTIVE:

Unremarkable. 



BONES:

No acute fracture.  Spinal degenerative changes. 



OTHER FINDINGS:

Right inguinal region bilobed 6.8 x 5.6 cm hematoma.



IMPRESSION:

Right common femoral artery serpiginous pseudoaneurysm measuring 

approximately 1.9 x 1.0 cm with thin neck and adjacent 6.8 x 5.6 cm 

bilobed hematoma.



Additional findings as above. 



The patient was transferred to [facility] for further 

care.

## 2019-02-17 ENCOUNTER — HOSPITAL ENCOUNTER (EMERGENCY)
Dept: HOSPITAL 14 - H.ER | Age: 69
Discharge: HOME | End: 2019-02-17
Payer: MEDICARE

## 2019-02-17 VITALS
SYSTOLIC BLOOD PRESSURE: 139 MMHG | HEART RATE: 88 BPM | OXYGEN SATURATION: 98 % | TEMPERATURE: 98.3 F | DIASTOLIC BLOOD PRESSURE: 89 MMHG

## 2019-02-17 VITALS — BODY MASS INDEX: 18.8 KG/M2

## 2019-02-17 VITALS — RESPIRATION RATE: 18 BRPM

## 2019-02-17 DIAGNOSIS — I10: ICD-10-CM

## 2019-02-17 DIAGNOSIS — R07.89: Primary | ICD-10-CM

## 2019-02-17 DIAGNOSIS — E03.9: ICD-10-CM

## 2019-02-17 DIAGNOSIS — Z88.1: ICD-10-CM

## 2019-02-17 DIAGNOSIS — Z85.850: ICD-10-CM

## 2019-02-17 DIAGNOSIS — Z88.0: ICD-10-CM

## 2019-02-17 LAB
BASOPHILS # BLD AUTO: 0.1 K/UL (ref 0–0.2)
BASOPHILS NFR BLD: 1.1 % (ref 0–2)
BNP SERPL-MCNC: 149 PG/ML (ref 0–900)
BUN SERPL-MCNC: 9 MG/DL (ref 7–17)
CALCIUM SERPL-MCNC: 9.6 MG/DL (ref 8.4–10.2)
EOSINOPHIL # BLD AUTO: 0 K/UL (ref 0–0.7)
EOSINOPHIL NFR BLD: 0.6 % (ref 0–4)
ERYTHROCYTE [DISTWIDTH] IN BLOOD BY AUTOMATED COUNT: 12.9 % (ref 11.5–14.5)
GFR NON-AFRICAN AMERICAN: > 60
HGB BLD-MCNC: 14.2 G/DL (ref 12–16)
LYMPHOCYTES # BLD AUTO: 1.1 K/UL (ref 1–4.3)
LYMPHOCYTES NFR BLD AUTO: 20.1 % (ref 20–40)
MCH RBC QN AUTO: 34.6 PG (ref 27–31)
MCHC RBC AUTO-ENTMCNC: 35.1 G/DL (ref 33–37)
MCV RBC AUTO: 98.4 FL (ref 81–99)
MONOCYTES # BLD: 0.6 K/UL (ref 0–0.8)
MONOCYTES NFR BLD: 10.6 % (ref 0–10)
NEUTROPHILS # BLD: 3.6 K/UL (ref 1.8–7)
NEUTROPHILS NFR BLD AUTO: 67.6 % (ref 50–75)
NRBC BLD AUTO-RTO: 0.1 % (ref 0–0)
PLATELET # BLD: 246 K/UL (ref 130–400)
PMV BLD AUTO: 7.8 FL (ref 7.2–11.7)
RBC # BLD AUTO: 4.12 MIL/UL (ref 3.8–5.2)
WBC # BLD AUTO: 5.3 K/UL (ref 4.8–10.8)

## 2019-02-17 NOTE — ED PDOC
HPI: Chest Pain


Time Seen by Provider: 02/17/19 15:50


Chief Complaint (Nursing): Chest Pain


Chief Complaint (Provider): Chest Pain


History Per: Patient


History/Exam Limitations: no limitations


Current Symptoms Are (Timing): Still Present


Quality: Tightness


Additional Complaint(s): 





Yodit Griffiths is a 68 year old female with a past medical history of HTN, 

hematoma, MI and thyroid cancer, who presents to the emergency department 

complaining of chest pain that feels like tightness. Patient further states that

she had a argument with her  last night and woke up angry and has been 

feeling overwhelmed lately. Patient denies having any history of seizures. Gloria mullins further states that in October of 2018 she had a catheter procedure done on

her. Patient denies having any other complaints. 





PMD: Elizabeth BOLTON





Past Medical History


Reviewed: Historical Data, Nursing Documentation, Vital Signs


Vital Signs: 





                                Last Vital Signs











Temp  98.1 F   02/17/19 15:41


 


Pulse  89   02/17/19 15:41


 


Resp  18   02/17/19 15:41


 


BP  162/70 H  02/17/19 15:41


 


Pulse Ox  97   02/17/19 15:41














- Medical History


PMH: Arthritis (TENDERNITIS), Asthma, COPD, Gastrointestinal Ulcer, 

Hypothyroidism (hx of thyroidectomy)


   Denies: Chronic Kidney Disease


Other PMH: MI; hematoma; thyroid cancer





- Surgical History


Surgical History: Tonsillectomy


   Denies: Pacemaker





- Family History


Family History: States: Unknown Family Hx





- Immunization History


Hx Tetanus Toxoid Vaccination: No


Hx Influenza Vaccination: No


Hx Pneumococcal Vaccination: No





- Home Medications


Home Medications: 


                                Ambulatory Orders











 Medication  Instructions  Recorded


 


Albuterol HFA [Ventolin HFA 90 0.09 mg IH PRN PRN 10/24/18





mcg/actuation (8 g)]  


 


Aspirin [Ecotrin] 81 mg PO DAILY 10/24/18


 


Levothyroxine [Synthroid] 75 mcg PO DAILY 10/24/18














- Allergies


Allergies/Adverse Reactions: 


                                    Allergies











Allergy/AdvReac Type Severity Reaction Status Date / Time


 


acetaminophen Allergy  RASH Verified 02/17/19 15:48


 


Penicillins Allergy  ANAPHYLAXIS Verified 02/17/19 15:48


 


propoxyphene napsylate Allergy  RASH Verified 02/17/19 15:48





[From Darvocet-N]     


 


ciprofloxacin AdvReac  RASH Verified 02/17/19 15:48


 


ketorolac tromethamine AdvReac  RASH Verified 02/17/19 15:48





[From Toradol]     


 


NSAIDS (Non-Steroidal AdvReac  PAIN Verified 02/17/19 15:48





Anti-Inflamma     














Review of Systems


ROS Statement: Except As Marked, All Systems Reviewed And Found Negative


Cardiovascular: Positive for: Chest Pain


Respiratory: Negative for: Shortness of Breath





Physical Exam





- Reviewed


Nursing Documentation Reviewed: Yes


Vital Signs Reviewed: Yes





- Physical Exam


Appears: Positive for: Non-toxic, No Acute Distress (mildly anxious)


Head Exam: Positive for: ATRAUMATIC, NORMOCEPHALIC


Skin: Positive for: Normal Color, Warm, Dry


Eye Exam: Positive for: Normal appearance, EOMI, PERRL


Neck: Positive for: Normal, Painless ROM, Supple


Cardiovascular/Chest: Positive for: Regular Rate, Rhythm.  Negative for: Murmur


Respiratory: Positive for: Normal Breath Sounds.  Negative for: Respiratory 

Distress


Pulses-Radial (L): 2+


Pulses-Radial (R): 2+


Gastrointestinal/Abdominal: Positive for: Soft.  Negative for: Tenderness


Extremity: Positive for: Normal ROM, Pedal Edema.  Negative for: Deformity


Neurologic/Psych: Positive for: Alert, Oriented.  Negative for: Motor/Sensory 

Deficits





- Laboratory Results


Result Diagrams: 


                                 02/17/19 16:47





                                 02/17/19 16:47





- ECG


O2 Sat by Pulse Oximetry: 97 (RA)


Pulse Ox Interpretation: Normal





Medical Decision Making


Medical Decision Making: 





Time: 1543


A/P: work up to rule out cardiac etiology based on chest pain. Patient is high 

risk and will discuss case with Dr. oJhnson. 





--EKG


--BMP


--BNP


--Troponin I 


--CBC with differential


--Chest xray 2 views 





Chest xray findings: 


FINDINGS:





LUNGS:


No active pulmonary disease.





PLEURA:


No significant pleural effusion identified. No pneumothorax apparent.





CARDIOVASCULAR:


No aortic atherosclerotic calcification present.





Normal cardiac size. No pulmonary vascular congestion. 





OSSEOUS STRUCTURES:


No significant abnormalities.





VISUALIZED UPPER ABDOMEN:


Normal.





OTHER FINDINGS:


None.





IMPRESSION:


No active disease.








Time: 1810


--Patient's labs are normal and EKG shows NSR


--Patient states that she has not had any chest discomfort since the morning. 


--Case was discussed with patient's cardiologist, Dr. Johnson. 


--Patient is safe for discharge home. 


--Patient instructed to follow up with Dr. Johnson tomorrow or Tuesday. Return 

parameters discussed. 











-------------

--------------------------------------------------------------------------------


----   


Scribe Attestation:


Documented by Gideon Romero, acting as a scribe for Mary Ordaz MD.







Provider Scribe Attestation:


All medical record entries made by the Scribe were at my direction and 

personally dictated by me. I have reviewed the chart and agree that the record 

accurately reflects my personal performance of the history, physical exam, 

medical decision making, and the department course for this patient. I have also

personally directed, reviewed, and agree with the discharge instructions and 

disposition.








Disposition





- Clinical Impression


Clinical Impression: 


 Atypical chest pain








- Disposition


Referrals: 


aDrren Johnson MD [Staff Provider] - 


Disposition: Routine/Home


Disposition Time: 18:10


Condition: IMPROVED


Additional Instructions: 


Follow up with primary medical doctor and Dr. DEEPIKA Johnson.  Continue to take all 

home medications as prescribed.  Return to the emergency department if symptoms 

worsen or if new symptoms develop.


Instructions:  Chest Pain, Angina (DC)


Forms:  [place] (English)


Print Language: ENGLISH

## 2019-02-18 NOTE — CARD
--------------- APPROVED REPORT --------------





Date of service: 02/17/2019



EKG Measurement

Heart [id number]

[id number]

[id number]

[id number]

[id number]



<Conclusion>

Normal sinus rhythm

Nonspecific ST abnormality

Abnormal ECG

## 2023-08-26 NOTE — RAD
Date of service: 



02/17/2019



HISTORY:

 cough 



COMPARISON:

No prior.



TECHNIQUE:

Chest PA and lateral



FINDINGS:



LUNGS:

No active pulmonary disease.



PLEURA:

No significant pleural effusion identified. No pneumothorax apparent.



CARDIOVASCULAR:

No aortic atherosclerotic calcification present.



Normal cardiac size. No pulmonary vascular congestion. 



OSSEOUS STRUCTURES:

No significant abnormalities.



VISUALIZED UPPER ABDOMEN:

Normal.



OTHER FINDINGS:

None.



IMPRESSION:

No active disease.
Pressure injury stage 2 or greater